# Patient Record
Sex: FEMALE | Race: WHITE | URBAN - METROPOLITAN AREA
[De-identification: names, ages, dates, MRNs, and addresses within clinical notes are randomized per-mention and may not be internally consistent; named-entity substitution may affect disease eponyms.]

---

## 2023-10-02 PROBLEM — E78.5 HYPERLIPIDEMIA: Status: ACTIVE | Noted: 2023-10-02

## 2023-10-02 PROBLEM — M48.062 NEUROGENIC CLAUDICATION DUE TO LUMBAR SPINAL STENOSIS: Status: ACTIVE | Noted: 2023-10-02

## 2023-10-02 PROBLEM — D13.5 AMPULLARY ADENOMA: Status: ACTIVE | Noted: 2023-10-02

## 2023-10-02 PROBLEM — I10 BENIGN ESSENTIAL HYPERTENSION: Status: ACTIVE | Noted: 2023-10-02

## 2023-10-02 PROBLEM — R12 HEARTBURN: Status: ACTIVE | Noted: 2023-10-02

## 2023-10-02 RX ORDER — HYDROCHLOROTHIAZIDE 25 MG/1
25 TABLET ORAL DAILY
COMMUNITY

## 2023-10-02 RX ORDER — EPINEPHRINE 0.22MG
1 AEROSOL WITH ADAPTER (ML) INHALATION 2 TIMES DAILY
COMMUNITY

## 2023-10-02 RX ORDER — EZETIMIBE 10 MG/1
10 TABLET ORAL DAILY
COMMUNITY

## 2023-10-02 RX ORDER — OMEPRAZOLE 40 MG/1
40 CAPSULE, DELAYED RELEASE ORAL 2 TIMES DAILY
COMMUNITY

## 2023-10-02 RX ORDER — ROSUVASTATIN CALCIUM 10 MG/1
10 TABLET, COATED ORAL EVERY OTHER DAY
COMMUNITY

## 2023-10-02 RX ORDER — ATENOLOL 25 MG/1
25 TABLET ORAL DAILY
COMMUNITY

## 2023-10-02 RX ORDER — FAMOTIDINE 40 MG/1
40 TABLET, FILM COATED ORAL DAILY
COMMUNITY
Start: 2023-07-15

## 2023-10-02 RX ORDER — CALCIUM CARBONATE/VITAMIN D3 500-10/5ML
1 LIQUID (ML) ORAL DAILY
COMMUNITY

## 2023-10-02 RX ORDER — CLONAZEPAM 0.5 MG/1
TABLET ORAL
COMMUNITY
Start: 2023-09-06 | End: 2023-10-02

## 2023-10-02 RX ORDER — CHOLECALCIFEROL (VITAMIN D3) 25 MCG
1 TABLET ORAL DAILY
COMMUNITY

## 2023-10-02 RX ORDER — GABAPENTIN 100 MG/1
100 CAPSULE ORAL NIGHTLY
COMMUNITY
Start: 2023-09-21 | End: 2023-10-04 | Stop reason: ALTCHOICE

## 2023-10-02 RX ORDER — CELECOXIB 100 MG/1
100 CAPSULE ORAL 2 TIMES DAILY
COMMUNITY
Start: 2023-09-15

## 2023-10-02 RX ORDER — FENOFIBRATE 160 MG/1
160 TABLET ORAL DAILY
COMMUNITY
Start: 2023-07-20

## 2023-10-03 RX ORDER — GABAPENTIN 100 MG/1
100 CAPSULE ORAL NIGHTLY
Qty: 90 CAPSULE | Refills: 0 | OUTPATIENT
Start: 2023-10-03

## 2023-10-03 NOTE — TELEPHONE ENCOUNTER
No visible appointment in approximately 3 years? She needs to either see me or get refill from her PCP. Thank you Karen! Víctor RAMSAY

## 2023-10-04 DIAGNOSIS — M48.062 NEUROGENIC CLAUDICATION DUE TO LUMBAR SPINAL STENOSIS: Primary | ICD-10-CM

## 2023-10-04 RX ORDER — GABAPENTIN 300 MG/1
300 CAPSULE ORAL NIGHTLY
Qty: 30 CAPSULE | Refills: 0 | Status: CANCELLED | OUTPATIENT
Start: 2023-10-04 | End: 2023-11-03

## 2023-10-05 ENCOUNTER — TELEPHONE (OUTPATIENT)
Dept: NEUROSURGERY | Facility: CLINIC | Age: 78
End: 2023-10-05

## 2023-10-10 RX ORDER — GABAPENTIN 300 MG/1
300 CAPSULE ORAL NIGHTLY
Qty: 30 CAPSULE | Refills: 1 | Status: SHIPPED | OUTPATIENT
Start: 2023-10-10 | End: 2023-12-04